# Patient Record
Sex: MALE | Race: WHITE | NOT HISPANIC OR LATINO | Employment: UNEMPLOYED | URBAN - NONMETROPOLITAN AREA
[De-identification: names, ages, dates, MRNs, and addresses within clinical notes are randomized per-mention and may not be internally consistent; named-entity substitution may affect disease eponyms.]

---

## 2018-02-08 ENCOUNTER — HOSPITAL ENCOUNTER (INPATIENT)
Facility: HOSPITAL | Age: 23
LOS: 5 days | Discharge: HOME | End: 2018-02-13
Attending: PSYCHIATRY & NEUROLOGY | Admitting: PSYCHIATRY & NEUROLOGY

## 2018-02-08 PROBLEM — F33.2 MDD (MAJOR DEPRESSIVE DISORDER), RECURRENT EPISODE, SEVERE (HCC): Status: ACTIVE | Noted: 2018-02-08

## 2018-02-08 PROCEDURE — 25010000002 INFLUENZA VAC SPLIT QUAD 0.5 ML SUSPENSION PREFILLED SYRINGE: Performed by: PSYCHIATRY & NEUROLOGY

## 2018-02-08 PROCEDURE — 90686 IIV4 VACC NO PRSV 0.5 ML IM: CPT | Performed by: PSYCHIATRY & NEUROLOGY

## 2018-02-08 PROCEDURE — G0008 ADMIN INFLUENZA VIRUS VAC: HCPCS | Performed by: PSYCHIATRY & NEUROLOGY

## 2018-02-08 PROCEDURE — 25010000002 PNEUMOCOCCAL VAC POLYVALENT PER 0.5 ML: Performed by: PSYCHIATRY & NEUROLOGY

## 2018-02-08 PROCEDURE — 90732 PPSV23 VACC 2 YRS+ SUBQ/IM: CPT | Performed by: PSYCHIATRY & NEUROLOGY

## 2018-02-08 PROCEDURE — G0009 ADMIN PNEUMOCOCCAL VACCINE: HCPCS | Performed by: PSYCHIATRY & NEUROLOGY

## 2018-02-08 RX ORDER — IBUPROFEN 600 MG/1
600 TABLET ORAL EVERY 6 HOURS PRN
Status: DISCONTINUED | OUTPATIENT
Start: 2018-02-08 | End: 2018-02-13 | Stop reason: HOSPADM

## 2018-02-08 RX ORDER — ONDANSETRON 4 MG/1
4 TABLET, FILM COATED ORAL EVERY 6 HOURS PRN
Status: DISCONTINUED | OUTPATIENT
Start: 2018-02-08 | End: 2018-02-13 | Stop reason: HOSPADM

## 2018-02-08 RX ORDER — TRAZODONE HYDROCHLORIDE 50 MG/1
50 TABLET ORAL NIGHTLY PRN
Status: DISCONTINUED | OUTPATIENT
Start: 2018-02-08 | End: 2018-02-13 | Stop reason: HOSPADM

## 2018-02-08 RX ORDER — ALUMINA, MAGNESIA, AND SIMETHICONE 2400; 2400; 240 MG/30ML; MG/30ML; MG/30ML
15 SUSPENSION ORAL EVERY 6 HOURS PRN
Status: DISCONTINUED | OUTPATIENT
Start: 2018-02-08 | End: 2018-02-13 | Stop reason: HOSPADM

## 2018-02-08 RX ORDER — BENZTROPINE MESYLATE 1 MG/ML
0.5 INJECTION INTRAMUSCULAR; INTRAVENOUS DAILY PRN
Status: DISCONTINUED | OUTPATIENT
Start: 2018-02-08 | End: 2018-02-13 | Stop reason: HOSPADM

## 2018-02-08 RX ORDER — LOPERAMIDE HYDROCHLORIDE 2 MG/1
2 CAPSULE ORAL 4 TIMES DAILY PRN
Status: DISCONTINUED | OUTPATIENT
Start: 2018-02-08 | End: 2018-02-13 | Stop reason: HOSPADM

## 2018-02-08 RX ORDER — BENZTROPINE MESYLATE 1 MG/1
1 TABLET ORAL DAILY PRN
Status: DISCONTINUED | OUTPATIENT
Start: 2018-02-08 | End: 2018-02-13 | Stop reason: HOSPADM

## 2018-02-08 RX ORDER — BENZONATATE 100 MG/1
100 CAPSULE ORAL 3 TIMES DAILY PRN
Status: DISCONTINUED | OUTPATIENT
Start: 2018-02-08 | End: 2018-02-13 | Stop reason: HOSPADM

## 2018-02-08 RX ORDER — HYDROXYZINE 50 MG/1
50 TABLET, FILM COATED ORAL 3 TIMES DAILY PRN
Status: DISCONTINUED | OUTPATIENT
Start: 2018-02-08 | End: 2018-02-13 | Stop reason: HOSPADM

## 2018-02-08 RX ORDER — ECHINACEA PURPUREA EXTRACT 125 MG
2 TABLET ORAL AS NEEDED
Status: DISCONTINUED | OUTPATIENT
Start: 2018-02-08 | End: 2018-02-13 | Stop reason: HOSPADM

## 2018-02-08 RX ORDER — NICOTINE 21 MG/24HR
1 PATCH, TRANSDERMAL 24 HOURS TRANSDERMAL EVERY 24 HOURS
Status: DISCONTINUED | OUTPATIENT
Start: 2018-02-08 | End: 2018-02-09

## 2018-02-08 RX ORDER — ALBUTEROL SULFATE 90 UG/1
2 AEROSOL, METERED RESPIRATORY (INHALATION) EVERY 4 HOURS PRN
Status: DISCONTINUED | OUTPATIENT
Start: 2018-02-08 | End: 2018-02-13 | Stop reason: HOSPADM

## 2018-02-08 RX ORDER — FAMOTIDINE 20 MG/1
20 TABLET, FILM COATED ORAL 2 TIMES DAILY PRN
Status: DISCONTINUED | OUTPATIENT
Start: 2018-02-08 | End: 2018-02-13 | Stop reason: HOSPADM

## 2018-02-08 RX ORDER — ALBUTEROL SULFATE 90 UG/1
2 AEROSOL, METERED RESPIRATORY (INHALATION) EVERY 4 HOURS PRN
Status: ON HOLD | COMMUNITY
End: 2019-01-19

## 2018-02-08 RX ADMIN — INFLUENZA VIRUS VACCINE 0.5 ML: 15; 15; 15; 15 SUSPENSION INTRAMUSCULAR at 21:46

## 2018-02-08 RX ADMIN — TRAZODONE HYDROCHLORIDE 50 MG: 50 TABLET ORAL at 21:45

## 2018-02-08 RX ADMIN — PNEUMOCOCCAL VACCINE POLYVALENT 0.5 ML
25; 25; 25; 25; 25; 25; 25; 25; 25; 25; 25; 25; 25; 25; 25; 25; 25; 25; 25; 25; 25; 25; 25 INJECTION, SOLUTION INTRAMUSCULAR; SUBCUTANEOUS at 21:49

## 2018-02-08 RX ADMIN — NICOTINE 1 PATCH: 21 PATCH TRANSDERMAL at 15:37

## 2018-02-08 RX ADMIN — HYDROXYZINE HYDROCHLORIDE 50 MG: 50 TABLET ORAL at 15:38

## 2018-02-09 PROBLEM — F20.0 PARANOID SCHIZOPHRENIA (HCC): Status: ACTIVE | Noted: 2018-02-08

## 2018-02-09 LAB — HCV AB SER DONR QL: NORMAL

## 2018-02-09 PROCEDURE — 93005 ELECTROCARDIOGRAM TRACING: CPT | Performed by: PSYCHIATRY & NEUROLOGY

## 2018-02-09 PROCEDURE — 93010 ELECTROCARDIOGRAM REPORT: CPT | Performed by: INTERNAL MEDICINE

## 2018-02-09 PROCEDURE — 86803 HEPATITIS C AB TEST: CPT | Performed by: PSYCHIATRY & NEUROLOGY

## 2018-02-09 PROCEDURE — 99223 1ST HOSP IP/OBS HIGH 75: CPT | Performed by: PSYCHIATRY & NEUROLOGY

## 2018-02-09 RX ORDER — ARIPIPRAZOLE 10 MG/1
5 TABLET ORAL DAILY
Status: DISCONTINUED | OUTPATIENT
Start: 2018-02-09 | End: 2018-02-10

## 2018-02-09 RX ORDER — OLANZAPINE 5 MG/1
5 TABLET, ORALLY DISINTEGRATING ORAL ONCE
Status: COMPLETED | OUTPATIENT
Start: 2018-02-09 | End: 2018-02-09

## 2018-02-09 RX ORDER — NICOTINE 21 MG/24HR
1 PATCH, TRANSDERMAL 24 HOURS TRANSDERMAL
Status: DISCONTINUED | OUTPATIENT
Start: 2018-02-09 | End: 2018-02-09

## 2018-02-09 RX ADMIN — ALBUTEROL SULFATE 2 PUFF: 90 AEROSOL, METERED RESPIRATORY (INHALATION) at 00:04

## 2018-02-09 RX ADMIN — SALINE NASAL SPRAY 2 SPRAY: 1.5 SOLUTION NASAL at 00:05

## 2018-02-09 RX ADMIN — SALINE NASAL SPRAY 2 SPRAY: 1.5 SOLUTION NASAL at 21:52

## 2018-02-09 RX ADMIN — NICOTINE POLACRILEX 2 MG: 2 GUM, CHEWING ORAL at 13:59

## 2018-02-09 RX ADMIN — HYDROXYZINE HYDROCHLORIDE 50 MG: 50 TABLET ORAL at 01:05

## 2018-02-09 RX ADMIN — NICOTINE 1 PATCH: 21 PATCH TRANSDERMAL at 08:11

## 2018-02-09 RX ADMIN — HYDROXYZINE HYDROCHLORIDE 50 MG: 50 TABLET ORAL at 09:31

## 2018-02-09 RX ADMIN — ALUMINUM HYDROXIDE, MAGNESIUM HYDROXIDE, AND DIMETHICONE 15 ML: 400; 400; 40 SUSPENSION ORAL at 19:36

## 2018-02-09 RX ADMIN — ALBUTEROL SULFATE 2 PUFF: 90 AEROSOL, METERED RESPIRATORY (INHALATION) at 20:50

## 2018-02-09 RX ADMIN — NICOTINE POLACRILEX 2 MG: 2 GUM, CHEWING ORAL at 17:51

## 2018-02-09 RX ADMIN — OLANZAPINE 5 MG: 5 TABLET, ORALLY DISINTEGRATING ORAL at 22:21

## 2018-02-09 RX ADMIN — ARIPIPRAZOLE 5 MG: 10 TABLET ORAL at 11:06

## 2018-02-09 RX ADMIN — IBUPROFEN 600 MG: 600 TABLET ORAL at 09:31

## 2018-02-09 RX ADMIN — HYDROXYZINE HYDROCHLORIDE 50 MG: 50 TABLET ORAL at 17:52

## 2018-02-09 RX ADMIN — IBUPROFEN 600 MG: 600 TABLET ORAL at 18:23

## 2018-02-09 RX ADMIN — TRAZODONE HYDROCHLORIDE 50 MG: 50 TABLET ORAL at 20:52
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
## 2018-02-09 NOTE — PLAN OF CARE
Problem: BH Patient Care Overview (Adult)  Goal: Plan of Care Review  Outcome: Ongoing (interventions implemented as appropriate)    Goal: Interdisciplinary Rounds/Family Conference  Outcome: Ongoing (interventions implemented as appropriate)    Goal: Individualization and Mutuality  Outcome: Ongoing (interventions implemented as appropriate)    Goal: Discharge Needs Assessment  Outcome: Ongoing (interventions implemented as appropriate)      Problem:  Overarching Goals  Goal: Adheres to Safety Considerations for Self and Others  Outcome: Ongoing (interventions implemented as appropriate)    Goal: Optimized Coping Skills in Response to Life Stressors  Outcome: Ongoing (interventions implemented as appropriate)    Goal: Develops/Participates in Therapeutic [place] to Support Successful Transition  Outcome: Ongoing (interventions implemented as appropriate)

## 2018-02-09 NOTE — PLAN OF CARE
Problem:  Patient Care Overview (Adult)  Goal: Plan of Care Review  Outcome: Ongoing (interventions implemented as appropriate)  New admit with complaints of auditory and visual hallucinations. Stated upon admission that voices were telling him to kill himself, but he denied S.I. Rated anxiety and depression as 0. However as the evening progressed, patient became restless, anxious with many somatic complaints and particularly complained of wanting to go home because he missed his family and wanted to go home because he couldn't sleep here. During early part of night he began to be paranoid, stating he knew staff was talking about him, that the devil was taking over his body and that he feared something would come out of the Bible that was on the unit. Was medicated with Vistaril and he read in his room for a couple hours before falling asleep. Slept approx. 4 hours this shift.   02/09/18 0615   Coping/Psychosocial Response Interventions   Plan Of Care Reviewed With patient   Coping/Psychosocial   Patient Agreement with Plan of Care agrees   Patient Care Overview   Progress no change        Problem:  Overarching Goals  Goal: Adheres to Safety Considerations for Self and Others  Outcome: Ongoing (interventions implemented as appropriate)    Goal: Optimized Coping Skills in Response to Life Stressors  Outcome: Ongoing (interventions implemented as appropriate)    Goal: Develops/Participates in Therapeutic [place] to Support Successful Transition  Outcome: Ongoing (interventions implemented as appropriate)

## 2018-02-09 NOTE — NURSING NOTE
Called pt.'s  Mother Ember Velasquez regarding guardianship she verbalizes she was appointed overseer by the  social security office for his check that he has disability she verbalizes hasn't  been before a  to get guardianship to get papers  but that he signs a paper for her to help with his medical information .
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
## 2018-02-10 LAB
CHOLEST SERPL-MCNC: 95 MG/DL (ref 0–200)
HBA1C MFR BLD: 4.8 % (ref 4.5–5.7)
HDLC SERPL-MCNC: 31 MG/DL (ref 60–100)
LDLC SERPL CALC-MCNC: 52 MG/DL (ref 0–100)
LDLC/HDLC SERPL: 1.69 {RATIO}
TRIGL SERPL-MCNC: 58 MG/DL (ref 0–150)
VLDLC SERPL-MCNC: 11.6 MG/DL

## 2018-02-10 PROCEDURE — 80061 LIPID PANEL: CPT | Performed by: PSYCHIATRY & NEUROLOGY

## 2018-02-10 PROCEDURE — 83036 HEMOGLOBIN GLYCOSYLATED A1C: CPT | Performed by: PSYCHIATRY & NEUROLOGY

## 2018-02-10 PROCEDURE — 99232 SBSQ HOSP IP/OBS MODERATE 35: CPT | Performed by: PSYCHIATRY & NEUROLOGY

## 2018-02-10 RX ORDER — LORAZEPAM 2 MG/ML
2 INJECTION INTRAMUSCULAR EVERY 8 HOURS PRN
Status: DISCONTINUED | OUTPATIENT
Start: 2018-02-10 | End: 2018-02-10

## 2018-02-10 RX ORDER — RISPERIDONE 0.25 MG/1
0.5 TABLET ORAL EVERY 12 HOURS SCHEDULED
Status: DISCONTINUED | OUTPATIENT
Start: 2018-02-10 | End: 2018-02-12

## 2018-02-10 RX ORDER — HALOPERIDOL 5 MG/ML
5 INJECTION INTRAMUSCULAR EVERY 8 HOURS PRN
Status: DISCONTINUED | OUTPATIENT
Start: 2018-02-10 | End: 2018-02-13 | Stop reason: HOSPADM

## 2018-02-10 RX ORDER — DIPHENHYDRAMINE HYDROCHLORIDE 50 MG/ML
25 INJECTION INTRAMUSCULAR; INTRAVENOUS EVERY 8 HOURS PRN
Status: DISCONTINUED | OUTPATIENT
Start: 2018-02-10 | End: 2018-02-13 | Stop reason: HOSPADM

## 2018-02-10 RX ORDER — LORAZEPAM 2 MG/ML
2 INJECTION INTRAMUSCULAR EVERY 8 HOURS PRN
Status: DISCONTINUED | OUTPATIENT
Start: 2018-02-10 | End: 2018-02-13 | Stop reason: HOSPADM

## 2018-02-10 RX ADMIN — HYDROXYZINE HYDROCHLORIDE 50 MG: 50 TABLET ORAL at 08:16

## 2018-02-10 RX ADMIN — ALBUTEROL SULFATE 2 PUFF: 90 AEROSOL, METERED RESPIRATORY (INHALATION) at 13:21

## 2018-02-10 RX ADMIN — RISPERIDONE 0.5 MG: 0.25 TABLET ORAL at 20:52

## 2018-02-10 RX ADMIN — RISPERIDONE 0.5 MG: 0.25 TABLET ORAL at 13:21

## 2018-02-10 RX ADMIN — NICOTINE POLACRILEX 2 MG: 2 GUM, CHEWING ORAL at 17:38

## 2018-02-10 RX ADMIN — ALBUTEROL SULFATE 2 PUFF: 90 AEROSOL, METERED RESPIRATORY (INHALATION) at 19:57

## 2018-02-10 RX ADMIN — HYDROXYZINE HYDROCHLORIDE 50 MG: 50 TABLET ORAL at 16:44

## 2018-02-10 RX ADMIN — ARIPIPRAZOLE 5 MG: 10 TABLET ORAL at 08:16

## 2018-02-10 RX ADMIN — NICOTINE POLACRILEX 2 MG: 2 GUM, CHEWING ORAL at 19:57

## 2018-02-10 RX ADMIN — TRAZODONE HYDROCHLORIDE 50 MG: 50 TABLET ORAL at 20:52

## 2018-02-10 NOTE — PLAN OF CARE
Problem: BH Patient Care Overview (Adult)  Goal: Plan of Care Review  Outcome: Ongoing (interventions implemented as appropriate)    Goal: Interdisciplinary Rounds/Family Conference  Outcome: Ongoing (interventions implemented as appropriate)    Goal: Individualization and Mutuality  Outcome: Ongoing (interventions implemented as appropriate)    Goal: Discharge Needs Assessment  Outcome: Ongoing (interventions implemented as appropriate)      Problem:  Overarching Goals  Goal: Adheres to Safety Considerations for Self and Others  Outcome: Ongoing (interventions implemented as appropriate)    Goal: Optimized Coping Skills in Response to Life Stressors  Outcome: Ongoing (interventions implemented as appropriate)    Goal: Develops/Participates in Therapeutic [place] to Support Successful Transition  Outcome: Ongoing (interventions implemented as appropriate)

## 2018-02-10 NOTE — PLAN OF CARE
Problem:  Patient Care Overview (Adult)  Goal: Plan of Care Review  Outcome: Ongoing (interventions implemented as appropriate)  Rated anxiety and depression as 10. Denies S.I. States he still hears voices and sees things. Is restless and nervous. Focuses on difficulty sleeping even though he hadn't even been to bed. Disorganized and wanting his meds to help immediately. Dr. Jones was notified of patient's hallucinations and Zydis ordered, which did calm patient and he slept all of night.   02/10/18 0555   Coping/Psychosocial Response Interventions   Plan Of Care Reviewed With patient   Coping/Psychosocial   Patient Agreement with Plan of Care agrees   Patient Care Overview   Progress no change       Problem:  Overarching Goals  Goal: Adheres to Safety Considerations for Self and Others  Outcome: Ongoing (interventions implemented as appropriate)    Goal: Optimized Coping Skills in Response to Life Stressors  Outcome: Ongoing (interventions implemented as appropriate)    Goal: Develops/Participates in Therapeutic [place] to Support Successful Transition  Outcome: Ongoing (interventions implemented as appropriate)

## 2018-02-10 NOTE — PROGRESS NOTES
"INPATIENT PSYCHIATRIC PROGRESS NOTE    Name:  Ayaan Rothman  :  1995  MRN:  [id number]  Visit Number:  [id number]  Length of stay:  2    SUBJECTIVE  CC: psychosis    INTERVAL HISTORY:  The patient states that he is experiencing hallucinations but they are better, and he is feeling isolated over here and then added that he cannot stand to be around people. He states that he is ready to go home and kept repeating it over and over again and appeared to be getting agitated while making the demands. At that time it was decided to end the session.      Review of Systems   Constitutional: Negative.    HENT: Negative.    Eyes: Negative.    Respiratory: Negative.    Cardiovascular: Negative.        OBJECTIVE    Temp:  [98 °F (36.7 °C)-98.6 °F (37 °C)] 98 °F (36.7 °C)  Heart Rate:  [] 71  Resp:  [18] 18  BP: (136-149)/(83-93) 149/93    MENTAL STATUS EXAM:  Appearance:Casually dressed, good hygeine.   Cooperation: Resistant  Psychomotor: Restelessness  Speech-normal rate, amount.  Mood \"irritable\"   Affect-restricted  Thought Content- delusional and paranoid  Thought process-disorganized.  Suicidality: No SI  Homicidality: No HI  Perception:  AH/VH  Insight-poor   Judgement-poor    Lab Results (last 24 hours)     Procedure Component Value Units Date/Time    Hepatitis C Antibody [[id number]]  (Normal) Collected:  18 1445    Specimen:  Blood Updated:  18 1621     Hepatitis C Ab Non-Reactive    Hemoglobin A1c [[id number]]  (Normal) Collected:  02/10/18 0633    Specimen:  Blood Updated:  02/10/18 0714     Hemoglobin A1C 4.80 %     Lipid Panel [[id number]]  (Abnormal) Collected:  02/10/18 0633    Specimen:  Blood Updated:  02/10/18 0719     Total Cholesterol 95 mg/dL      Triglycerides 58 mg/dL      HDL Cholesterol 31 (L) mg/dL      LDL Cholesterol  52 mg/dL      VLDL Cholesterol 11.6 mg/dL      LDL/HDL Ratio 1.69    Narrative:       Cholesterol Reference Ranges  (U.S. Department of Health and Human Services " ATP III Classifications)    Desirable          <200 mg/dL  Borderline High    200-239 mg/dL  High Risk          >240 mg/dL      Triglyceride Reference Ranges  (U.S. Department of Health and Human Services ATP III Classifications)    Normal           <150 mg/dL  Borderline High  150-199 mg/dL  High             200-499 mg/dL  Very High        >500 mg/dL    HDL Reference Ranges  (U.S. Department of Health and Human Services ATP III Classifcations)    Low     <40 mg/dl (major risk factor for CHD)  High    >60 mg/dl ('negative' risk factor for CHD)        LDL Reference Ranges  (U.S. Department of Health and Human Services ATP III Classifcations)    Optimal          <100 mg/dL  Near Optimal     100-129 mg/dL  Borderline High  130-159 mg/dL  High             160-189 mg/dL  Very High        >189 mg/dL             Imaging Results (last 24 hours)     ** No results found for the last 24 hours. **             ECG/EMG Results (most recent)     Procedure Component Value Units Date/Time    ECG 12 Lead [[id number]] Collected:  02/09/18 1354     Updated:  02/09/18 1355    Narrative:       Test Reason : c/o heart fluttering  Blood Pressure : **/** mmHG  Vent. Rate : 096 BPM     Atrial Rate : 096 BPM     P-R Int : 152 ms          QRS Dur : 080 ms      QT Int : 322 ms       P-R-T Axes : 082 081 060 degrees     QTc Int : 406 ms    Normal sinus rhythm  Normal ECG  No previous ECGs available    Referred By:  SHANE           Confirmed By:            ALLERGIES: Review of patient's allergies indicates no known allergies.      Current Facility-Administered Medications:   •  albuterol (PROVENTIL HFA;VENTOLIN HFA) inhaler 2 puff, 2 puff, Inhalation, Q4H PRN, Vidal Negron MD, 2 puff at 02/09/18 2050  •  aluminum-magnesium hydroxide-simethicone (MAALOX MAX) 400-400-40 MG/5ML suspension 15 mL, 15 mL, Oral, Q6H PRN, Vidal Negron MD, 15 mL at 02/09/18 1936  •  ARIPiprazole (ABILIFY) tablet 5 mg, 5 mg, Oral, Daily, Kemal Jones,  MD, 5 mg at 02/10/18 0816  •  benzonatate (TESSALON) capsule 100 mg, 100 mg, Oral, TID PRN, Vidal Negron MD  •  benztropine (COGENTIN) tablet 1 mg, 1 mg, Oral, Daily PRN **OR** benztropine (COGENTIN) injection 0.5 mg, 0.5 mg, Intramuscular, Daily PRN, Vidal Negron MD  •  famotidine (PEPCID) tablet 20 mg, 20 mg, Oral, BID PRN, Vidal Negron MD  •  hydrOXYzine (ATARAX) tablet 50 mg, 50 mg, Oral, TID PRN, Vidal Negron MD, 50 mg at 02/10/18 0816  •  ibuprofen (ADVIL,MOTRIN) tablet 600 mg, 600 mg, Oral, Q6H PRN, Vidal Negron MD, 600 mg at 02/09/18 1823  •  loperamide (IMODIUM) capsule 2 mg, 2 mg, Oral, 4x Daily PRN, Vidal Negron MD  •  magnesium hydroxide (MILK OF MAGNESIA) suspension 2400 mg/10mL 10 mL, 10 mL, Oral, Daily PRN, Vidal Negron MD  •  nicotine polacrilex (NICORETTE) gum 2 mg, 2 mg, Mouth/Throat, Q2H PRN, Kemal Jones MD, 2 mg at 02/09/18 1751  •  ondansetron (ZOFRAN) tablet 4 mg, 4 mg, Oral, Q6H PRN, Vidal Negron MD  •  sodium chloride (OCEAN) nasal spray 2 spray, 2 spray, Each Nare, PRN, Vidal Negron MD, 2 spray at 02/09/18 2152  •  traZODone (DESYREL) tablet 50 mg, 50 mg, Oral, Nightly PRN, Vidal Negron MD, 50 mg at 02/09/18 2052    ASSESSMENT & PLAN:    Active Problems:    Paranoid schizophrenia  Plan: Stop Abilify. Start Risperdal 0.5 mg bid. Add (Haldol 5 mg + Ativan 2 mg + Benadryl 25 mg) IM q 8 hr prn.      Suicide precautions: Suicide precaution Level 3 (q15 min checks)     Behavioral Health Treatment Plan and Problem List: I have reviewed and approved the Behavioral Health Treatment Plan and Problem list.  The patient has had a chance to review and agrees with the treatment plan.     Clinician:  Kemal Jones MD  02/10/18  10:58 AM

## 2018-02-10 NOTE — NURSING NOTE
"Pt. Restless and complaining of hearing voices and seeing things. States: \"It's freaking me out. I can't sleep. I just need to see my mom and dad.\" Stated he knows staff is talking about him. Stated: \"I'm not a bad person. I just need help. You all can't do it here. It makes me worse.\" Then complained of not being able to sleep or rest even though he hadn't even went to his room. Seemed very distraught and shaking his head and walking back and forth as if he didn't know what to do. Dr. Jones was notified and order for Zydis received.  "

## 2018-02-11 PROCEDURE — 99232 SBSQ HOSP IP/OBS MODERATE 35: CPT | Performed by: PSYCHIATRY & NEUROLOGY

## 2018-02-11 RX ORDER — OLANZAPINE 10 MG/1
10 TABLET, ORALLY DISINTEGRATING ORAL ONCE
Status: COMPLETED | OUTPATIENT
Start: 2018-02-11 | End: 2018-02-11

## 2018-02-11 RX ADMIN — ALBUTEROL SULFATE 2 PUFF: 90 AEROSOL, METERED RESPIRATORY (INHALATION) at 08:20

## 2018-02-11 RX ADMIN — HYDROXYZINE HYDROCHLORIDE 50 MG: 50 TABLET ORAL at 16:52

## 2018-02-11 RX ADMIN — RISPERIDONE 0.5 MG: 0.25 TABLET ORAL at 08:20

## 2018-02-11 RX ADMIN — RISPERIDONE 0.5 MG: 0.25 TABLET ORAL at 20:51

## 2018-02-11 RX ADMIN — OLANZAPINE 10 MG: 5 TABLET, ORALLY DISINTEGRATING ORAL at 22:52

## 2018-02-11 RX ADMIN — HYDROXYZINE HYDROCHLORIDE 50 MG: 50 TABLET ORAL at 01:27

## 2018-02-11 RX ADMIN — MAGNESIUM HYDROXIDE 10 ML: 2400 SUSPENSION ORAL at 09:58

## 2018-02-11 RX ADMIN — TRAZODONE HYDROCHLORIDE 50 MG: 50 TABLET ORAL at 20:51

## 2018-02-11 RX ADMIN — ALBUTEROL SULFATE 2 PUFF: 90 AEROSOL, METERED RESPIRATORY (INHALATION) at 21:24

## 2018-02-11 RX ADMIN — NICOTINE POLACRILEX 2 MG: 2 GUM, CHEWING ORAL at 15:42

## 2018-02-11 RX ADMIN — NICOTINE POLACRILEX 2 MG: 2 GUM, CHEWING ORAL at 21:24

## 2018-02-11 RX ADMIN — HYDROXYZINE HYDROCHLORIDE 50 MG: 50 TABLET ORAL at 20:51

## 2018-02-11 RX ADMIN — NICOTINE POLACRILEX 2 MG: 2 GUM, CHEWING ORAL at 11:53
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
## 2018-02-11 NOTE — PROGRESS NOTES
"INPATIENT PSYCHIATRIC PROGRESS NOTE    Name:  Ayaan Rothman  :  1995  MRN:  [id number]  Visit Number:  [id number]  Length of stay:  3    SUBJECTIVE  CC: psychosis    INTERVAL HISTORY:  The patient states that he is feeling a lot better and Risperdal has really helped him. He is denying any depression, anxiety or hallucinations.      Review of Systems   Constitutional: Negative.    HENT: Negative.    Eyes: Negative.    Respiratory: Negative.    Cardiovascular: Negative.        OBJECTIVE    Temp:  [96.6 °F (35.9 °C)-97.8 °F (36.6 °C)] 96.6 °F (35.9 °C)  Heart Rate:  [] 97  Resp:  [18] 18  BP: (130-154)/(74-91) 139/87    MENTAL STATUS EXAM:  Appearance:Casually dressed, good hygeine.   Cooperation:Cooperative  Psychomotor: No psychomotor agitation/retardation, No EPS, No motor tics  Speech-normal rate, amount.  Mood \"irritable\"   Affect-congruent, appropriate, stable  Thought Content-goal directed, no delusional material present  Thought process-linear, organized.  Suicidality: No SI  Homicidality: No HI  Perception: No AH/VH  Insight-fair   Judgement-fair    Lab Results (last 24 hours)     ** No results found for the last 24 hours. **             Imaging Results (last 24 hours)     ** No results found for the last 24 hours. **             ECG/EMG Results (most recent)     Procedure Component Value Units Date/Time    ECG 12 Lead [[id number]] Collected:  18 1354     Updated:  02/10/18 1903    Narrative:       Test Reason : c/o heart fluttering  Blood Pressure : **/** mmHG  Vent. Rate : 096 BPM     Atrial Rate : 096 BPM     P-R Int : 152 ms          QRS Dur : 080 ms      QT Int : 322 ms       P-R-T Axes : 082 081 060 degrees     QTc Int : 406 ms    Normal sinus rhythm  Normal ECG  No previous ECGs available  Confirmed by Gabe Hawkins ([id number]) on 2/10/2018 7:03:01 PM    Referred By:  SHANE           Confirmed By:Gabe Hawkins           ALLERGIES: Review of patient's allergies indicates no known " allergies.      Current Facility-Administered Medications:   •  albuterol (PROVENTIL HFA;VENTOLIN HFA) inhaler 2 puff, 2 puff, Inhalation, Q4H PRN, Vidal Negron MD, 2 puff at 02/11/18 0820  •  aluminum-magnesium hydroxide-simethicone (MAALOX MAX) 400-400-40 MG/5ML suspension 15 mL, 15 mL, Oral, Q6H PRN, Vidal Negron MD, 15 mL at 02/09/18 1936  •  benzonatate (TESSALON) capsule 100 mg, 100 mg, Oral, TID PRN, Vidal Negron MD  •  benztropine (COGENTIN) tablet 1 mg, 1 mg, Oral, Daily PRN **OR** benztropine (COGENTIN) injection 0.5 mg, 0.5 mg, Intramuscular, Daily PRN, Vidal Negron MD  •  diphenhydrAMINE (BENADRYL) injection 25 mg, 25 mg, Intramuscular, Q8H PRN, Kemal Jones MD  •  famotidine (PEPCID) tablet 20 mg, 20 mg, Oral, BID PRN, Vidal Negron MD  •  haloperidol lactate (HALDOL) injection 5 mg, 5 mg, Intramuscular, Q8H PRN, Kemal Jones MD  •  hydrOXYzine (ATARAX) tablet 50 mg, 50 mg, Oral, TID PRN, Vidal Negron MD, 50 mg at 02/11/18 0127  •  ibuprofen (ADVIL,MOTRIN) tablet 600 mg, 600 mg, Oral, Q6H PRN, Vidal Negron MD, 600 mg at 02/09/18 1823  •  loperamide (IMODIUM) capsule 2 mg, 2 mg, Oral, 4x Daily PRN, Vidal Ngeron MD  •  LORazepam (ATIVAN) injection 2 mg, 2 mg, Intramuscular, Q8H PRN, Kemal Jones MD  •  magnesium hydroxide (MILK OF MAGNESIA) suspension 2400 mg/10mL 10 mL, 10 mL, Oral, Daily PRN, Vidal Negron MD, 10 mL at 02/11/18 0958  •  nicotine polacrilex (NICORETTE) gum 2 mg, 2 mg, Mouth/Throat, Q2H PRN, Kemal Jones MD, 2 mg at 02/10/18 1957  •  ondansetron (ZOFRAN) tablet 4 mg, 4 mg, Oral, Q6H PRN, Vidal Negron MD  •  risperiDONE (risperDAL) tablet 0.5 mg, 0.5 mg, Oral, Q12H, Kemal Jones MD, 0.5 mg at 02/11/18 0820  •  sodium chloride (OCEAN) nasal spray 2 spray, 2 spray, Each Nare, PRN, Vidal Negron MD, 2 spray at 02/09/18 0522  •  traZODone (DESYREL) tablet 50 mg, 50 mg, Oral, Nightly  Vidal DUARTE MD, 50 mg at 02/10/18 2052    ASSESSMENT & PLAN:    Active Problems:    Paranoid schizophrenia  Plan: Continue Risperdal 0.5 mg bid.       Suicide precautions: Suicide precaution Level 3 (q15 min checks)     Behavioral Health Treatment Plan and Problem List: I have reviewed and approved the Behavioral Health Treatment Plan and Problem list.  The patient has had a chance to review and agrees with the treatment plan.     Clinician:  Kemal Jones MD  02/11/18  11:22 AM

## 2018-02-11 NOTE — PLAN OF CARE
Problem:  Patient Care Overview (Adult)  Goal: Plan of Care Review  Outcome: Ongoing (interventions implemented as appropriate)  Rates anxiety and depression as 10. Denies S.I. Continues to be needy and approaches staff often with several requests or complaints of not being able to sleep or rest here and that he is homesick and needs to go home. Then a few minutes later he says he'll stay as long as needed. Up and down during night. Slept approx. 4 hours t   02/11/18 0630   Coping/Psychosocial Response Interventions   Plan Of Care Reviewed With patient   Coping/Psychosocial   Patient Agreement with Plan of Care agrees   Patient Care Overview   Progress no change   his shift.    Problem:  Overarching Goals  Goal: Adheres to Safety Considerations for Self and Others  Outcome: Ongoing (interventions implemented as appropriate)    Goal: Optimized Coping Skills in Response to Life Stressors  Outcome: Ongoing (interventions implemented as appropriate)    Goal: Develops/Participates in Therapeutic [place] to Support Successful Transition  Outcome: Ongoing (interventions implemented as appropriate)

## 2018-02-11 NOTE — PLAN OF CARE
Problem: BH Patient Care Overview (Adult)  Goal: Plan of Care Review  Outcome: Ongoing (interventions implemented as appropriate)   02/11/18 1613   Coping/Psychosocial Response Interventions   Plan Of Care Reviewed With patient   Coping/Psychosocial   Patient Agreement with Plan of Care agrees   Patient Care Overview   Progress improving   Outcome Evaluation   Outcome Summary/Follow up Plan PT REPORTS ANXIETY 10/10, DENIES ANY DEPRESSION, SI, OR HI. REPORTS HEARING VOICES AND SEEING DOTS.        Problem:  Overarching Goals  Goal: Adheres to Safety Considerations for Self and Others  Outcome: Ongoing (interventions implemented as appropriate)    Goal: Optimized Coping Skills in Response to Life Stressors  Outcome: Ongoing (interventions implemented as appropriate)    Goal: Develops/Participates in Therapeutic [place] to Support Successful Transition  Outcome: Ongoing (interventions implemented as appropriate)

## 2018-02-12 PROCEDURE — 99232 SBSQ HOSP IP/OBS MODERATE 35: CPT | Performed by: PSYCHIATRY & NEUROLOGY

## 2018-02-12 RX ORDER — RISPERIDONE 1 MG/1
1 TABLET ORAL EVERY 12 HOURS SCHEDULED
Status: DISCONTINUED | OUTPATIENT
Start: 2018-02-12 | End: 2018-02-13 | Stop reason: HOSPADM

## 2018-02-12 RX ADMIN — HYDROXYZINE HYDROCHLORIDE 50 MG: 50 TABLET ORAL at 11:58

## 2018-02-12 RX ADMIN — NICOTINE POLACRILEX 2 MG: 2 GUM, CHEWING ORAL at 08:40

## 2018-02-12 RX ADMIN — RISPERIDONE 1 MG: 1 TABLET, FILM COATED ORAL at 20:57

## 2018-02-12 RX ADMIN — TRAZODONE HYDROCHLORIDE 50 MG: 50 TABLET ORAL at 20:57

## 2018-02-12 RX ADMIN — ALBUTEROL SULFATE 2 PUFF: 90 AEROSOL, METERED RESPIRATORY (INHALATION) at 14:30

## 2018-02-12 RX ADMIN — MAGNESIUM HYDROXIDE 10 ML: 2400 SUSPENSION ORAL at 14:30

## 2018-02-12 RX ADMIN — NICOTINE POLACRILEX 2 MG: 2 GUM, CHEWING ORAL at 18:03

## 2018-02-12 RX ADMIN — RISPERIDONE 0.5 MG: 0.25 TABLET ORAL at 08:40
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
## 2018-02-12 NOTE — NURSING NOTE
CALLED MD R/T PT C/O ANXIETY/AGGITATION AND UNABLE TO SLEEP NEW ROOM MATE MUMBLING IN SLEEP AND MAKING NOISES.  NEW ORDER FOR ONE TIME DOSE ZYPREXA 10MG

## 2018-02-12 NOTE — PLAN OF CARE
Problem:  Patient Care Overview (Adult)  Goal: Plan of Care Review  Outcome: Ongoing (interventions implemented as appropriate)  Rated anxiety as 8, but denied depression or S.I. Pt. is disorganized and paranoid. He changes what he wants to do or what he things is best for him numerous times during shift. For instance one minute he wanted a roommate and thought it would be good and changed to being afraid of having a roommate. Then stated he would stay as long as we wanted, but then again said he needed to get out tonight and requested we call his parents. Very focused on being homesick, not being able to sleep. Gets real restless, pacing, begging staff for shots to knock him out. Dr. Logan was notified and pt. was given Zydis 10 mg. Eventually this did help him to calm down and go to sleep.   02/12/18 1331   Coping/Psychosocial Response Interventions   Plan Of Care Reviewed With patient   Patient Care Overview   Progress no change       Problem:  Overarching Goals  Goal: Adheres to Safety Considerations for Self and Others  Outcome: Ongoing (interventions implemented as appropriate)    Goal: Optimized Coping Skills in Response to Life Stressors  Outcome: Ongoing (interventions implemented as appropriate)    Goal: Develops/Participates in Therapeutic [place] to Support Successful Transition  Outcome: Ongoing (interventions implemented as appropriate)

## 2018-02-12 NOTE — PROGRESS NOTES
"INPATIENT PSYCHIATRIC PROGRESS NOTE    Name:  Ayaan Rothman  :  1995  MRN:  [id number]  Visit Number:  [id number]  Length of stay:  4    SUBJECTIVE  CC: psychosis    INTERVAL HISTORY:  The patient states that he couldn't sleep last night and it made him very anxious and he is feeling overwhelmed. He agreed to increase Risperdal dose to help with his anxiety and paranoia.       Review of Systems   Constitutional: Negative.    HENT: Negative.    Eyes: Negative.    Respiratory: Negative.    Cardiovascular: Negative.        OBJECTIVE    Temp:  [98.3 °F (36.8 °C)] 98.3 °F (36.8 °C)  Heart Rate:  [] 102  Resp:  [18] 18  BP: (115-158)/(67-96) 115/67    MENTAL STATUS EXAM:  Appearance:Casually dressed, good hygeine.   Cooperation:Cooperative  Psychomotor: No psychomotor agitation/retardation, No EPS, No motor tics  Speech-normal rate, amount.  Mood \"irritable\"   Affect-congruent, appropriate, stable  Thought Content-paranoid  Thought process-linear, organized.  Suicidality: No SI  Homicidality: No HI  Perception: No AH/VH  Insight-fair   Judgement-fair    Lab Results (last 24 hours)     ** No results found for the last 24 hours. **             Imaging Results (last 24 hours)     ** No results found for the last 24 hours. **             ECG/EMG Results (most recent)     Procedure Component Value Units Date/Time    ECG 12 Lead [[id number]] Collected:  18 1354     Updated:  02/10/18 1903    Narrative:       Test Reason : c/o heart fluttering  Blood Pressure : **/** mmHG  Vent. Rate : 096 BPM     Atrial Rate : 096 BPM     P-R Int : 152 ms          QRS Dur : 080 ms      QT Int : 322 ms       P-R-T Axes : 082 081 060 degrees     QTc Int : 406 ms    Normal sinus rhythm  Normal ECG  No previous ECGs available  Confirmed by Gabe Hawkins ([id number]) on 2/10/2018 7:03:01 PM    Referred By:  SHANE           Confirmed By:Gabe Hawkins           ALLERGIES: Review of patient's allergies indicates no known " allergies.      Current Facility-Administered Medications:   •  albuterol (PROVENTIL HFA;VENTOLIN HFA) inhaler 2 puff, 2 puff, Inhalation, Q4H PRN, Vidal Negron MD, 2 puff at 02/11/18 2124  •  aluminum-magnesium hydroxide-simethicone (MAALOX MAX) 400-400-40 MG/5ML suspension 15 mL, 15 mL, Oral, Q6H PRN, Viadl Negron MD, 15 mL at 02/09/18 1936  •  benzonatate (TESSALON) capsule 100 mg, 100 mg, Oral, TID PRN, Vidal Negron MD  •  benztropine (COGENTIN) tablet 1 mg, 1 mg, Oral, Daily PRN **OR** benztropine (COGENTIN) injection 0.5 mg, 0.5 mg, Intramuscular, Daily PRN, Vidal Negron MD  •  diphenhydrAMINE (BENADRYL) injection 25 mg, 25 mg, Intramuscular, Q8H PRN, Kemal Jones MD  •  famotidine (PEPCID) tablet 20 mg, 20 mg, Oral, BID PRN, Vidal Negron MD  •  haloperidol lactate (HALDOL) injection 5 mg, 5 mg, Intramuscular, Q8H PRN, Kemal Jones MD  •  hydrOXYzine (ATARAX) tablet 50 mg, 50 mg, Oral, TID PRN, Vidal Negron MD, 50 mg at 02/12/18 1158  •  ibuprofen (ADVIL,MOTRIN) tablet 600 mg, 600 mg, Oral, Q6H PRN, Vidal Negron MD, 600 mg at 02/09/18 1823  •  loperamide (IMODIUM) capsule 2 mg, 2 mg, Oral, 4x Daily PRN, Vidal Negron MD  •  LORazepam (ATIVAN) injection 2 mg, 2 mg, Intramuscular, Q8H PRN, Kemal Jones MD  •  magnesium hydroxide (MILK OF MAGNESIA) suspension 2400 mg/10mL 10 mL, 10 mL, Oral, Daily PRN, Vidal Negron MD, 10 mL at 02/11/18 0958  •  nicotine polacrilex (NICORETTE) gum 2 mg, 2 mg, Mouth/Throat, Q2H PRN, Kemal Jones MD, 2 mg at 02/12/18 0840  •  ondansetron (ZOFRAN) tablet 4 mg, 4 mg, Oral, Q6H PRN, Vidal Negron MD  •  risperiDONE (risperDAL) tablet 0.5 mg, 0.5 mg, Oral, Q12H, Kemal Jones MD, 0.5 mg at 02/12/18 0840  •  sodium chloride (OCEAN) nasal spray 2 spray, 2 spray, Each Nare, PRN, Vidal Negron MD, 2 spray at 02/09/18 9873  •  traZODone (DESYREL) tablet 50 mg, 50 mg, Oral, Nightly  Vidal DUARTE MD, 50 mg at 02/11/18 2051    ASSESSMENT & PLAN:    Active Problems:    Paranoid schizophrenia  Plan: Increase Risperdal 1 mg bid.       Suicide precautions: Suicide precaution Level 3 (q15 min checks)     Behavioral Health Treatment Plan and Problem List: I have reviewed and approved the Behavioral Health Treatment Plan and Problem list.  The patient has had a chance to review and agrees with the treatment plan.     Clinician:  Kemal Jones MD  02/12/18  1:16 PM

## 2018-02-12 NOTE — PLAN OF CARE
Problem: [facility] Patient Care Overview (Adult)  Goal: Plan of Care Review  Outcome: Ongoing (interventions implemented as appropriate)  Rates anx. 10 denies any depression he verbalizes hearing voices but they are better he verbalzies medications are helping interaction noted with peers    02/12/18 0043   Coping/Psychosocial Response Interventions   Plan Of Care Reviewed With patient   Coping/Psychosocial   Patient Agreement with Plan of Care agrees   Patient Care Overview   Progress improving       Problem:  Overarching Goals  Goal: Adheres to Safety Considerations for Self and Others  Outcome: Ongoing (interventions implemented as appropriate)    Goal: Optimized Coping Skills in Response to Life Stressors  Outcome: Ongoing (interventions implemented as appropriate)    Goal: Develops/Participates in Therapeutic [place] to Support Successful Transition  Outcome: Ongoing (interventions implemented as appropriate)

## 2018-02-13 VITALS
HEART RATE: 107 BPM | BODY MASS INDEX: 24.36 KG/M2 | TEMPERATURE: 97.6 F | DIASTOLIC BLOOD PRESSURE: 82 MMHG | RESPIRATION RATE: 18 BRPM | OXYGEN SATURATION: 99 % | WEIGHT: 155.2 LBS | HEIGHT: 67 IN | SYSTOLIC BLOOD PRESSURE: 151 MMHG

## 2018-02-13 PROCEDURE — 99238 HOSP IP/OBS DSCHRG MGMT 30/<: CPT | Performed by: PSYCHIATRY & NEUROLOGY

## 2018-02-13 RX ORDER — RISPERIDONE 1 MG/1
1 TABLET ORAL EVERY 12 HOURS SCHEDULED
Qty: 60 TABLET | Refills: 0 | Status: ON HOLD | OUTPATIENT
Start: 2018-02-13 | End: 2019-01-19

## 2018-02-13 RX ADMIN — RISPERIDONE 1 MG: 1 TABLET, FILM COATED ORAL at 08:06

## 2018-02-13 NOTE — PLAN OF CARE
Problem:  Patient Care Overview (Adult)  Goal: Plan of Care Review  Outcome: Outcome(s) achieved Date Met: 02/13/18 02/13/18 0926   Coping/Psychosocial Response Interventions   Plan Of Care Reviewed With patient   Coping/Psychosocial   Patient Agreement with Plan of Care agrees   Patient Care Overview   Progress improving   Outcome Evaluation   Outcome Summary/Follow up Plan Ready for discharge.     Goal: Interdisciplinary Rounds/Family Conference  Outcome: Outcome(s) achieved Date Met: 02/13/18    Goal: Individualization and Mutuality  Outcome: Outcome(s) achieved Date Met: 02/13/18    Goal: Discharge Needs Assessment  Outcome: Outcome(s) achieved Date Met: 02/13/18      Problem:  Overarching Goals  Goal: Adheres to Safety Considerations for Self and Others  Outcome: Outcome(s) achieved Date Met: 02/13/18    Goal: Optimized Coping Skills in Response to Life Stressors  Outcome: Outcome(s) achieved Date Met: 02/13/18    Goal: Develops/Participates in Therapeutic [place] to Support Successful Transition  Outcome: Outcome(s) achieved Date Met: 02/13/18      Problem: Alteration in Thoughts and Perception  Goal: Treatment Goal: Gain control of psychotic behaviors/thinking, reduce/eliminate presenting symptoms and demonstrate improved reality functioning upon discharge  Outcome: Outcome(s) achieved Date Met: 02/13/18    Goal: Verbalize thoughts and feelings associated with:  Outcome: Outcome(s) achieved Date Met: 02/13/18    Goal: Refrain from acting on delusional thinking/internal stimuli  Outcome: Outcome(s) achieved Date Met: 02/13/18    Goal: Agree to be compliant with medication regime, as prescribed and report medication side effects  Outcome: Outcome(s) achieved Date Met: 02/13/18    Goal: Attend and participate in unit activities, including therapeutic, recreational, and educational groups  Outcome: Outcome(s) achieved Date Met: 02/13/18    Goal: Recognize dysfunctional thoughts, communicate reality-based  thoughts at the time of discharge  Outcome: Outcome(s) achieved Date Met: 02/13/18    Goal: Complete daily ADLs, including personal hygiene independently, as able  Outcome: Outcome(s) achieved Date Met: 02/13/18      Problem: Coping, Compromised Individual (Adult,Obstetrics,Pediatric)  Goal: Identify Related Risk Factors and Signs and Symptoms  Outcome: Outcome(s) achieved Date Met: 02/13/18    Goal: Effective Coping  Outcome: Outcome(s) achieved Date Met: 02/13/18
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
## 2018-02-13 NOTE — PLAN OF CARE
"Problem:  Patient Care Overview (Adult)  Goal: Plan of Care Review  Outcome: Ongoing (interventions implemented as appropriate)   02/12/18 1732 02/12/18 2120 02/13/18 0154   Coping/Psychosocial Response Interventions   Plan Of Care Reviewed With --  patient --    Coping/Psychosocial   Patient Agreement with Plan of Care --  agrees --    Patient Care Overview   Progress improving --  --    Outcome Evaluation   Outcome Summary/Follow up Plan --  --  Patient rates anxiety 7, denies depression. denies SI/HI/AVH at this time. reports poor sleep last night due to roommate but states he thinks it will be better tonight since his roommate is \"medicated\". states he is missing home and that makes him a little sad but he is not depressed.        Problem:  Overarching Goals  Goal: Adheres to Safety Considerations for Self and Others  Outcome: Ongoing (interventions implemented as appropriate)    Goal: Optimized Coping Skills in Response to Life Stressors  Outcome: Ongoing (interventions implemented as appropriate)    Goal: Develops/Participates in Therapeutic [place] to Support Successful Transition  Outcome: Ongoing (interventions implemented as appropriate)        "

## 2018-02-13 NOTE — PROGRESS NOTES
Bridge Session  Date:  2/13/18  Time: 4449-4506    Data:  Reason for Inpatient Admission: This patient is a 22 y.o. male who was admitted on 2/8/2018 with complaints of auditory hallucinations, telling him to hurt himself.      Follow up: [facility] in [place], [place] on 2/13/18 at 2pm.  This appointment will be rescheduled if the patient cannot be discharged and transported in time for that appointment.  Family will be transporting.     Coping Skills to Utilize: Current methods of coping include hiking, walking, fishing and other outdoor activities.  The patient plans to seek out emotional support from his family and engage in family activities more often.      Crisis Safety Plan:  • Support System to utilize and contact numbers: Mom, Dad and brother [phone]    • Educated on crisis hotline numbers (yes/no): yes    • Was the Patient made aware of contact information for the following: community mental health centers, crisis stabilization programs, residential programs, , etc (yes/no): yes    • Will transportation be a barrier (yes/no): no    • If so, explain solution(s) to resolve barrier: n/a    • How and where will the patient obtain prescribed medications: Medication(s) will be filled at the [facility] with patient's insurance prior to him leaving the hospital.     Assessment:  This patient denies any thoughts to harm himself or others.  He verbalizes improved mood and the absence of auditory command type hallucinations and paranoia has significantly decreased.  He verbalized a plan for continued treatment, medication compliance, coping skills and support.     Plan:    Discussed the importance of follow up treatment for continuity of care. The Patient was able to verbalize understanding and commitment to the individualized aftercare and crisis safety plan.

## 2019-01-19 ENCOUNTER — HOSPITAL ENCOUNTER (INPATIENT)
Facility: HOSPITAL | Age: 24
LOS: 3 days | Discharge: HOME | End: 2019-01-22
Attending: PSYCHIATRY & NEUROLOGY | Admitting: PSYCHIATRY & NEUROLOGY

## 2019-01-19 PROBLEM — F25.9 SCHIZO-AFFECTIVE PSYCHOSIS (HCC): Status: ACTIVE | Noted: 2019-01-19

## 2019-01-19 RX ORDER — RISPERIDONE 2 MG/1
2 TABLET ORAL EVERY 12 HOURS SCHEDULED
Status: DISCONTINUED | OUTPATIENT
Start: 2019-01-19 | End: 2019-01-20

## 2019-01-19 RX ORDER — RISPERIDONE 2 MG/1
2 TABLET ORAL 2 TIMES DAILY
COMMUNITY
End: 2019-01-22 | Stop reason: HOSPADM

## 2019-01-19 RX ORDER — NICOTINE 21 MG/24HR
1 PATCH, TRANSDERMAL 24 HOURS TRANSDERMAL
Status: DISCONTINUED | OUTPATIENT
Start: 2019-01-19 | End: 2019-01-20

## 2019-01-19 RX ORDER — IBUPROFEN 600 MG/1
600 TABLET ORAL EVERY 6 HOURS PRN
Status: DISCONTINUED | OUTPATIENT
Start: 2019-01-19 | End: 2019-01-22 | Stop reason: HOSPADM

## 2019-01-19 RX ORDER — BENZONATATE 100 MG/1
100 CAPSULE ORAL 3 TIMES DAILY PRN
Status: DISCONTINUED | OUTPATIENT
Start: 2019-01-19 | End: 2019-01-22 | Stop reason: HOSPADM

## 2019-01-19 RX ORDER — HYDROXYZINE 50 MG/1
50 TABLET, FILM COATED ORAL EVERY 6 HOURS PRN
Status: DISCONTINUED | OUTPATIENT
Start: 2019-01-19 | End: 2019-01-19

## 2019-01-19 RX ORDER — TRAZODONE HYDROCHLORIDE 50 MG/1
50 TABLET ORAL NIGHTLY PRN
Status: DISCONTINUED | OUTPATIENT
Start: 2019-01-19 | End: 2019-01-22 | Stop reason: HOSPADM

## 2019-01-19 RX ORDER — FAMOTIDINE 20 MG/1
20 TABLET, FILM COATED ORAL 2 TIMES DAILY PRN
Status: DISCONTINUED | OUTPATIENT
Start: 2019-01-19 | End: 2019-01-22 | Stop reason: HOSPADM

## 2019-01-19 RX ORDER — BENZTROPINE MESYLATE 1 MG/1
1 TABLET ORAL DAILY PRN
Status: DISCONTINUED | OUTPATIENT
Start: 2019-01-19 | End: 2019-01-22 | Stop reason: HOSPADM

## 2019-01-19 RX ORDER — RISPERIDONE 2 MG/1
2 TABLET ORAL EVERY 12 HOURS SCHEDULED
Status: CANCELLED | OUTPATIENT
Start: 2019-01-19

## 2019-01-19 RX ORDER — ECHINACEA PURPUREA EXTRACT 125 MG
2 TABLET ORAL AS NEEDED
Status: DISCONTINUED | OUTPATIENT
Start: 2019-01-19 | End: 2019-01-22 | Stop reason: HOSPADM

## 2019-01-19 RX ORDER — LOPERAMIDE HYDROCHLORIDE 2 MG/1
2 CAPSULE ORAL 4 TIMES DAILY PRN
Status: DISCONTINUED | OUTPATIENT
Start: 2019-01-19 | End: 2019-01-22 | Stop reason: HOSPADM

## 2019-01-19 RX ORDER — BENZTROPINE MESYLATE 1 MG/ML
0.5 INJECTION INTRAMUSCULAR; INTRAVENOUS DAILY PRN
Status: DISCONTINUED | OUTPATIENT
Start: 2019-01-19 | End: 2019-01-22 | Stop reason: HOSPADM

## 2019-01-19 RX ORDER — ALUMINA, MAGNESIA, AND SIMETHICONE 2400; 2400; 240 MG/30ML; MG/30ML; MG/30ML
15 SUSPENSION ORAL EVERY 6 HOURS PRN
Status: DISCONTINUED | OUTPATIENT
Start: 2019-01-19 | End: 2019-01-22 | Stop reason: HOSPADM

## 2019-01-19 RX ORDER — ONDANSETRON 4 MG/1
4 TABLET, FILM COATED ORAL EVERY 6 HOURS PRN
Status: DISCONTINUED | OUTPATIENT
Start: 2019-01-19 | End: 2019-01-22 | Stop reason: HOSPADM

## 2019-01-19 RX ADMIN — TRAZODONE HYDROCHLORIDE 50 MG: 50 TABLET ORAL at 21:28

## 2019-01-19 RX ADMIN — RISPERIDONE 2 MG: 2 TABLET, FILM COATED ORAL at 19:42

## 2019-01-19 RX ADMIN — NICOTINE 1 PATCH: 21 PATCH TRANSDERMAL at 18:41

## 2019-01-19 RX ADMIN — BENZONATATE 100 MG: 100 CAPSULE, LIQUID FILLED ORAL at 23:13

## 2019-01-19 NOTE — PLAN OF CARE
Problem: Patient Care Overview  Goal: Plan of Care Review  Outcome: Ongoing (interventions implemented as appropriate)    Goal: Individualization and Mutuality  Outcome: Ongoing (interventions implemented as appropriate)    Goal: Discharge Needs Assessment  Outcome: Ongoing (interventions implemented as appropriate)    Goal: Interprofessional Rounds/Family Conf  Outcome: Ongoing (interventions implemented as appropriate)      Problem: Overarching Goals (Adult)  Goal: Adheres to Safety Considerations for Self and Others  Outcome: Ongoing (interventions implemented as appropriate)    Goal: Optimized Coping Skills in Response to Life Stressors  Outcome: Ongoing (interventions implemented as appropriate)    Goal: Develops/Participates in Therapeutic [place] to Support Successful Transition  Outcome: Ongoing (interventions implemented as appropriate)      Problem: Suicidal Behavior (Adult)  Goal: Suicidal Behavior is Absent/Minimized/Managed  Outcome: Ongoing (interventions implemented as appropriate)      Problem: Mood Impairment (Depressive Signs/Symptoms) (Adult)  Goal: Improved Mood Symptoms (Depressive Signs/Symptoms)  Outcome: Ongoing (interventions implemented as appropriate)      Problem: Thought Process Alteration (Adult)  Goal: Identify Related Risk Factors and Signs and Symptoms  Outcome: Ongoing (interventions implemented as appropriate)    Goal: Improved Thought Process  Outcome: Ongoing (interventions implemented as appropriate)

## 2019-01-19 NOTE — NURSING NOTE
I spoke with patient's step-father Anthony Velasquez (ph [phone]) who stated he and his wife are the patient's legal guardians.  He stated his wife is working this weekend. and they would not be able to bring a copy in until Monday.

## 2019-01-20 PROBLEM — J45.909 ASTHMA: Status: ACTIVE | Noted: 2019-01-20

## 2019-01-20 PROBLEM — R74.8 ELEVATED CPK: Status: ACTIVE | Noted: 2019-01-20

## 2019-01-20 PROBLEM — F12.20 TETRAHYDROCANNABINOL (THC) USE DISORDER, SEVERE, DEPENDENCE (HCC): Status: ACTIVE | Noted: 2019-01-20

## 2019-01-20 PROCEDURE — 99223 1ST HOSP IP/OBS HIGH 75: CPT | Performed by: PSYCHIATRY & NEUROLOGY

## 2019-01-20 RX ORDER — RISPERIDONE 3 MG/1
3 TABLET ORAL EVERY 12 HOURS SCHEDULED
Status: DISCONTINUED | OUTPATIENT
Start: 2019-01-20 | End: 2019-01-22 | Stop reason: HOSPADM

## 2019-01-20 RX ADMIN — RISPERIDONE 3 MG: 3 TABLET ORAL at 20:54

## 2019-01-20 RX ADMIN — TRAZODONE HYDROCHLORIDE 50 MG: 50 TABLET ORAL at 20:54

## 2019-01-20 RX ADMIN — NICOTINE POLACRILEX 2 MG: 2 GUM, CHEWING BUCCAL at 14:08
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
## 2019-01-20 NOTE — PLAN OF CARE
"Problem: Patient Care Overview  Goal: Plan of Care Review  Outcome: Ongoing (interventions implemented as appropriate)   01/20/19 0157   Coping/Psychosocial   Plan of Care Reviewed With patient   Coping/Psychosocial   Patient Agreement with Plan of Care agrees   Plan of Care Review   Progress no change   OTHER   Outcome Summary Pt frequently seeks out staff asking for medications. States \"I'm a pyschopath when I get mad bad things happen\". Rates anxiety depression 10/10. Reports SI-no plan. Reports hearing voices telling him to kill people. Denies HI.       Problem: Overarching Goals (Adult)  Goal: Adheres to Safety Considerations for Self and Others  Outcome: Ongoing (interventions implemented as appropriate)    Goal: Optimized Coping Skills in Response to Life Stressors  Outcome: Ongoing (interventions implemented as appropriate)    Goal: Develops/Participates in Therapeutic [place] to Support Successful Transition  Outcome: Ongoing (interventions implemented as appropriate)        "

## 2019-01-20 NOTE — PLAN OF CARE
Problem: Patient Care Overview  Goal: Plan of Care Review  Outcome: Ongoing (interventions implemented as appropriate)   01/20/19 9576   Coping/Psychosocial   Plan of Care Reviewed With patient   Coping/Psychosocial   Patient Agreement with Plan of Care refuses to participate   Plan of Care Review   Progress no change   OTHER   Outcome Summary PT RATES ANXIETY AND DEPRESSION 10/10. REPORTS SI WITH NO PLAN. DENIES ANY HI. REPORS VOICES THAT TELL HIM TO DO BAD THINGS. PT IS VERY IRRITABLE WITH STAFF AND PEERS.        Problem: Overarching Goals (Adult)  Goal: Adheres to Safety Considerations for Self and Others  Outcome: Ongoing (interventions implemented as appropriate)    Goal: Optimized Coping Skills in Response to Life Stressors  Outcome: Ongoing (interventions implemented as appropriate)    Goal: Develops/Participates in Therapeutic [place] to Support Successful Transition  Outcome: Ongoing (interventions implemented as appropriate)
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
## 2019-01-21 PROCEDURE — 99232 SBSQ HOSP IP/OBS MODERATE 35: CPT | Performed by: PSYCHIATRY & NEUROLOGY

## 2019-01-21 RX ADMIN — TRAZODONE HYDROCHLORIDE 50 MG: 50 TABLET ORAL at 21:36

## 2019-01-21 RX ADMIN — NICOTINE POLACRILEX 2 MG: 2 GUM, CHEWING BUCCAL at 23:48

## 2019-01-21 RX ADMIN — RISPERIDONE 3 MG: 3 TABLET ORAL at 08:18

## 2019-01-21 RX ADMIN — NICOTINE POLACRILEX 2 MG: 2 GUM, CHEWING BUCCAL at 08:18

## 2019-01-21 RX ADMIN — RISPERIDONE 3 MG: 3 TABLET ORAL at 20:31

## 2019-01-21 NOTE — PROGRESS NOTES
"INPATIENT PSYCHIATRIC PROGRESS NOTE    Name:  Ayaan Rothman  :  1995  MRN:  [id number]  Visit Number:  [id number]  Length of stay:  2    SUBJECTIVE  CC/Focus of Exam: psychosis    INTERVAL HISTORY:  The patient states he is feeling better but is not feeling well all the way. States the higher dose of medication is helping and he is not experiencing any hallucinations.  Depression rating 5/10  Anxiety rating 10/10  Sleep: good  Withdrawal sx: denies  Cravin/10    Review of Systems   Respiratory: Negative.    Cardiovascular: Negative.    Gastrointestinal: Negative.        OBJECTIVE    Temp:  [97.9 °F (36.6 °C)-98.2 °F (36.8 °C)] 97.9 °F (36.6 °C)  Heart Rate:  [83-85] 85  Resp:  [16-18] 16  BP: (125-145)/(60-85) 145/85    MENTAL STATUS EXAM:  Appearance:Casually dressed, good hygeine.   Cooperation:Cooperative  Psychomotor: No psychomotor agitation/retardation, No EPS, No motor tics  Speech-normal rate, amount.  Mood \"good\"   Affect-congruent, appropriate, stable  Thought Content-goal directed, no delusional material present  Thought process-linear, organized.  Suicidality: No SI  Homicidality: No HI  Perception: No AH/VH  Insight-fair   Judgement-fair    Lab Results (last 24 hours)     ** No results found for the last 24 hours. **             Imaging Results (last 24 hours)     ** No results found for the last 24 hours. **             ECG/EMG Results (most recent)     None           ALLERGIES: Benadryl [diphenhydramine]      Current Facility-Administered Medications:   •  aluminum-magnesium hydroxide-simethicone (MAALOX MAX) 400-400-40 MG/5ML suspension 15 mL, 15 mL, Oral, Q6H PRN, Kemal Jones MD  •  benzonatate (TESSALON) capsule 100 mg, 100 mg, Oral, TID PRN, Kemal Jones MD, 100 mg at [id number] [phone]  •  benztropine (COGENTIN) tablet 1 mg, 1 mg, Oral, Daily PRN **OR** benztropine (COGENTIN) injection 0.5 mg, 0.5 mg, Intramuscular, Daily PRN, Kemal Jones MD  •  famotidine (PEPCID) tablet 20 mg, 20 " mg, Oral, BID PRN, Kemal Jones MD  •  ibuprofen (ADVIL,MOTRIN) tablet 600 mg, 600 mg, Oral, Q6H PRN, Kemal Jones MD  •  loperamide (IMODIUM) capsule 2 mg, 2 mg, Oral, 4x Daily PRN, Kemal Jones MD  •  magnesium hydroxide (MILK OF MAGNESIA) suspension 2400 mg/10mL 10 mL, 10 mL, Oral, Daily PRN, Kemal Jones MD  •  nicotine polacrilex (NICORETTE) gum 2 mg, 2 mg, Mouth/Throat, Q2H PRN, Kemal Jones MD, 2 mg at 01/21/19 0818  •  ondansetron (ZOFRAN) tablet 4 mg, 4 mg, Oral, Q6H PRN, Kemal Jones MD  •  risperiDONE (risperDAL) tablet 3 mg, 3 mg, Oral, Q12H, Kemal Jones MD, 3 mg at 01/21/19 0818  •  sodium chloride (OCEAN) nasal spray 2 spray, 2 spray, Each Nare, PRN, Kemal Jones MD  •  traZODone (DESYREL) tablet 50 mg, 50 mg, Oral, Nightly PRN, Kemal Jones MD, 50 mg at 01/20/19 2054    ASSESSMENT & PLAN:     Paranoid schizophrenia (CMS/HCC)  - continue Risperdal       Elevated CPK  - Repeat level  - Check BMP       Tetrahydrocannabinol (THC) use disorder, severe, dependence (CMS/HCC)  - Substance use counseling       Nicotine use disorder  - Nicoderm patch  - Encourage cessation       Asthma  - Albuterol prn      Suicide precautions: Suicide precaution Level 3 (q15 min checks)     Behavioral Health Treatment Plan and Problem List: I have reviewed and approved the Behavioral Health Treatment Plan and Problem list.  The patient has had a chance to review and agrees with the treatment plan.     Clinician:  Kemal Jones MD  01/21/19  12:22 PM

## 2019-01-21 NOTE — PLAN OF CARE
"Problem: Patient Care Overview  Goal: Plan of Care Review  Outcome: Ongoing (interventions implemented as appropriate)   01/21/19 0247   Coping/Psychosocial   Plan of Care Reviewed With patient   Coping/Psychosocial   Patient Agreement with Plan of Care agrees   Plan of Care Review   Progress no change   OTHER   Outcome Summary Pt irritable, demanding at times. Pt reports fear of being around others however was in dayroom talking to peers this evening. Reports anxiety depression does not rate. Denies SI HI. Reports hearing \"people talking bad about me\". Reports feeling paranoid.       Problem: Overarching Goals (Adult)  Goal: Adheres to Safety Considerations for Self and Others  Outcome: Ongoing (interventions implemented as appropriate)    Goal: Optimized Coping Skills in Response to Life Stressors  Outcome: Ongoing (interventions implemented as appropriate)    Goal: Develops/Participates in Therapeutic [place] to Support Successful Transition  Outcome: Ongoing (interventions implemented as appropriate)        "

## 2019-01-21 NOTE — PROGRESS NOTES
"1300  Met with patient for individual he reports feeling some better today. He states \" I will just tell you the reason I am here is because I started smoking pot again\". He reports smoking pot has contributed to his increase in anxiety and the voices. He continues to hear voices today telling him to do bad things. He reports he wants meds for anxiety. He again states he has trouble at night sleeping because of anxiety. We discussed the benefits of relaxation to reduce stress. He states he does not have much of a life and he does not go out much or have many friends. We discussed healthy ways to interact with others and ways to meet friends.     Patient denies suicidal thoughts or thoughts to harm others today. He rates anxiety and depression at 8/10. He continues to hear voices.     Continue stabilization individual and group therapy to focus on healthy coping skills and schedule follow up care with [facility].  "

## 2019-01-22 VITALS
WEIGHT: 180.2 LBS | HEART RATE: 117 BPM | DIASTOLIC BLOOD PRESSURE: 91 MMHG | SYSTOLIC BLOOD PRESSURE: 147 MMHG | OXYGEN SATURATION: 97 % | HEIGHT: 70 IN | BODY MASS INDEX: 25.8 KG/M2 | RESPIRATION RATE: 18 BRPM | TEMPERATURE: 97.6 F

## 2019-01-22 LAB
ANION GAP SERPL CALCULATED.3IONS-SCNC: 8.7 MMOL/L (ref 3.6–11.2)
BUN BLD-MCNC: 11 MG/DL (ref 7–21)
BUN/CREAT SERPL: 15.5 (ref 7–25)
CALCIUM SPEC-SCNC: 9.5 MG/DL (ref 7.7–10)
CHLORIDE SERPL-SCNC: 104 MMOL/L (ref 99–112)
CK SERPL-CCNC: 123 U/L (ref 24–204)
CO2 SERPL-SCNC: 25.3 MMOL/L (ref 24.3–31.9)
CREAT BLD-MCNC: 0.71 MG/DL (ref 0.43–1.29)
GFR SERPL CREATININE-BSD FRML MDRD: 139 ML/MIN/1.73
GLUCOSE BLD-MCNC: 67 MG/DL (ref 70–110)
OSMOLALITY SERPL CALC.SUM OF ELEC: 273.3 MOSM/KG (ref 273–305)
POTASSIUM BLD-SCNC: 4.1 MMOL/L (ref 3.5–5.3)
SODIUM BLD-SCNC: 138 MMOL/L (ref 135–153)

## 2019-01-22 PROCEDURE — 99238 HOSP IP/OBS DSCHRG MGMT 30/<: CPT | Performed by: PSYCHIATRY & NEUROLOGY

## 2019-01-22 PROCEDURE — 80048 BASIC METABOLIC PNL TOTAL CA: CPT | Performed by: PSYCHIATRY & NEUROLOGY

## 2019-01-22 PROCEDURE — 82550 ASSAY OF CK (CPK): CPT | Performed by: PSYCHIATRY & NEUROLOGY

## 2019-01-22 RX ORDER — RISPERIDONE 3 MG/1
3 TABLET ORAL EVERY 12 HOURS SCHEDULED
Qty: 60 TABLET | Refills: 0 | Status: SHIPPED | OUTPATIENT
Start: 2019-01-22

## 2019-01-22 RX ADMIN — BENZONATATE 100 MG: 100 CAPSULE, LIQUID FILLED ORAL at 01:12

## 2019-01-22 RX ADMIN — RISPERIDONE 3 MG: 3 TABLET ORAL at 08:13

## 2019-01-22 NOTE — DISCHARGE SUMMARY
"      PSYCHIATRIC DISCHARGE SUMMARY     Patient Identification:  Name:  Ayaan Rothman  Age:  23 y.o.  Sex:  male  :  1995  MRN:  [id number]  Visit Number:  [id number]      Date of Admission:2019   Date of Discharge:  2019    Discharge Diagnosis:  Principal Problem:    Paranoid schizophrenia (CMS/HCC)  Active Problems:    Tetrahydrocannabinol (THC) use disorder, severe, dependence (CMS/HCC)    Asthma        Admission Diagnosis:  Schizo-affective psychosis (CMS/HCC) [F25.9]     Hospital Course  Patient is a 23 y.o. male presented with worsening psychosis. He was admitted to the [facility] unit for safety, further evaluation and treatment.  He admitted to smoking marijuana and it could have contributed to exacerbation of his psychosis. He reported his medication was not working as well as previously. He wanted the dose of Risperdal to be increased and it was raised to 3 mg bid. The patient had an uneventful stay in the hospital and was discharged in improved condition after a short stay on 19.      Mental Status Exam upon discharge:   Mood \"good\"   Affect-congruent, appropriate, stable  Thought Content-goal directed, no delusional material present  Thought process-linear, organized.  Suicidality: No SI  Homicidality: No HI  Perception: No AH/VH    Procedures Performed         Consults:   Consults     No orders found from 2018 to 2019.          Pertinent Test Results:   Creatinine Kinase was elevated at admission but normalized to 123 U/L on the day of discharge.      Condition on Discharge:  improved    Vital Signs  Temp:  [97.6 °F (36.4 °C)-98.5 °F (36.9 °C)] 97.6 °F (36.4 °C)  Heart Rate:  [] 117  Resp:  [18] 18  BP: (145-147)/(86-91) 147/91      Discharge Disposition:  Home or Self Care    Discharge Medications:     Discharge Medications      Changes to Medications      Instructions Start Date   risperiDONE 3 MG tablet  Commonly known as:  risperDAL  What changed:  "   · medication strength  · how much to take  · when to take this   3 mg, Oral, Every 12 Hours Scheduled             Discharge Diet: Regular    Activity at Discharge: As tolerated    Follow-up Appointments        [place] [id number] [phone]     January 25 2019 at 1:45pm with Bill.          Test Results Pending at Discharge      Clinician:   Kemal Jones MD  01/22/19  1:30 PM

## 2019-01-22 NOTE — PLAN OF CARE
Problem: Patient Care Overview  Goal: Plan of Care Review  Outcome: Ongoing (interventions implemented as appropriate)   01/22/19 0541   Coping/Psychosocial   Plan of Care Reviewed With patient   Coping/Psychosocial   Patient Agreement with Plan of Care agrees   Plan of Care Review   Progress no change   OTHER   Outcome Summary PT RATED ANXIETY & DEPRESSION BOTH 10, REPORTED FEELING PARANOID & FEELSLIKE PEOPLE ARE PLOTTING AGAINST HIM, REQUESTING MEDICATIONS FREQUENTLY, UP & DOWN MOST OF THE NIGHT.       Problem: Overarching Goals (Adult)  Goal: Adheres to Safety Considerations for Self and Others  Outcome: Ongoing (interventions implemented as appropriate)    Goal: Optimized Coping Skills in Response to Life Stressors  Outcome: Ongoing (interventions implemented as appropriate)    Goal: Develops/Participates in Therapeutic [place] to Support Successful Transition  Outcome: Ongoing (interventions implemented as appropriate)